# Patient Record
Sex: FEMALE | Race: WHITE
[De-identification: names, ages, dates, MRNs, and addresses within clinical notes are randomized per-mention and may not be internally consistent; named-entity substitution may affect disease eponyms.]

---

## 2017-08-23 ENCOUNTER — HOSPITAL ENCOUNTER (EMERGENCY)
Dept: HOSPITAL 10 - FTE | Age: 42
Discharge: HOME | End: 2017-08-23
Payer: COMMERCIAL

## 2017-08-23 VITALS — SYSTOLIC BLOOD PRESSURE: 108 MMHG | RESPIRATION RATE: 20 BRPM | DIASTOLIC BLOOD PRESSURE: 59 MMHG | HEART RATE: 68 BPM

## 2017-08-23 VITALS — WEIGHT: 174.17 LBS | BODY MASS INDEX: 40.31 KG/M2 | HEIGHT: 55 IN

## 2017-08-23 DIAGNOSIS — N39.0: ICD-10-CM

## 2017-08-23 DIAGNOSIS — R11.0: ICD-10-CM

## 2017-08-23 DIAGNOSIS — R10.11: Primary | ICD-10-CM

## 2017-08-23 LAB
ADD UMIC: YES
ALBUMIN SERPL-MCNC: 4.1 G/DL (ref 3.3–4.9)
ALBUMIN/GLOB SERPL: 1.05 {RATIO}
ALP SERPL-CCNC: 126 IU/L (ref 42–121)
ALT SERPL-CCNC: 89 IU/L (ref 13–69)
ANION GAP SERPL CALC-SCNC: 20 MMOL/L (ref 8–16)
AST SERPL-CCNC: 58 IU/L (ref 15–46)
BACTERIA #/AREA URNS HPF: (no result) /HPF
BASOPHILS # BLD AUTO: 0.1 10^3/UL (ref 0–0.1)
BASOPHILS NFR BLD: 0.7 % (ref 0–2)
BILIRUB DIRECT SERPL-MCNC: 0 MG/DL (ref 0–0.2)
BILIRUB SERPL-MCNC: 0.1 MG/DL (ref 0.2–1.3)
BUN SERPL-MCNC: 13 MG/DL (ref 7–20)
CALCIUM SERPL-MCNC: 9.4 MG/DL (ref 8.4–10.2)
CHLORIDE SERPL-SCNC: 101 MMOL/L (ref 97–110)
CO2 SERPL-SCNC: 27 MMOL/L (ref 21–31)
COLOR UR: YELLOW
CREAT SERPL-MCNC: 0.57 MG/DL (ref 0.44–1)
EOSINOPHIL # BLD: 0.7 10^3/UL (ref 0–0.5)
EOSINOPHIL NFR BLD: 6.9 % (ref 0–7)
ERYTHROCYTE [DISTWIDTH] IN BLOOD BY AUTOMATED COUNT: 14.6 % (ref 11.5–14.5)
GLOBULIN SER-MCNC: 3.9 G/DL (ref 1.3–3.2)
GLUCOSE SERPL-MCNC: 95 MG/DL (ref 70–220)
GLUCOSE UR STRIP-MCNC: NEGATIVE MG/DL
HCT VFR BLD CALC: 37.5 % (ref 37–47)
HGB BLD-MCNC: 12.4 G/DL (ref 12–16)
KETONES UR STRIP.AUTO-MCNC: NEGATIVE MG/DL
LYMPHOCYTES # BLD AUTO: 2.1 10^3/UL (ref 0.8–2.9)
LYMPHOCYTES NFR BLD AUTO: 21.5 % (ref 15–51)
MCH RBC QN AUTO: 25.2 PG (ref 29–33)
MCHC RBC AUTO-ENTMCNC: 33.1 G/DL (ref 32–37)
MCV RBC AUTO: 76.1 FL (ref 82–101)
MONOCYTES # BLD: 1 10^3/UL (ref 0.3–0.9)
MONOCYTES NFR BLD: 10.4 % (ref 0–11)
NEUTROPHILS NFR BLD AUTO: 60.3 % (ref 39–77)
NITRITE UR QL STRIP.AUTO: POSITIVE MG/DL
NRBC # BLD MANUAL: 0 10^3/UL (ref 0–0)
NRBC BLD AUTO-RTO: 0 /100WBC (ref 0–0)
PLATELET # BLD: 448 10^3/UL (ref 140–415)
PMV BLD AUTO: 10.1 FL (ref 7.4–10.4)
POTASSIUM SERPL-SCNC: 4.2 MMOL/L (ref 3.5–5.1)
PROT SERPL-MCNC: 8 G/DL (ref 6.1–8.1)
RBC # BLD AUTO: 4.93 10^6/UL (ref 4.2–5.4)
RBC # UR AUTO: (no result) MG/DL
SODIUM SERPL-SCNC: 144 MMOL/L (ref 135–144)
UR ASCORBIC ACID: NEGATIVE MG/DL
UR BILIRUBIN (DIP): NEGATIVE MG/DL
UR CLARITY: CLEAR
UR PH (DIP): 5 (ref 5–9)
UR RBC: 1 /HPF (ref 0–5)
UR SPECIFIC GRAVITY (DIP): 1.01 (ref 1–1.03)
UR TOTAL PROTEIN (DIP): NEGATIVE MG/DL
UROBILINOGEN UR STRIP-ACNC: NEGATIVE MG/DL
WBC # BLD AUTO: 9.7 10^3/UL (ref 4.8–10.8)
WBC # UR STRIP: (no result) LEU/UL

## 2017-08-23 PROCEDURE — 80053 COMPREHEN METABOLIC PANEL: CPT

## 2017-08-23 PROCEDURE — 81001 URINALYSIS AUTO W/SCOPE: CPT

## 2017-08-23 PROCEDURE — 85025 COMPLETE CBC W/AUTO DIFF WBC: CPT

## 2017-08-23 PROCEDURE — 76705 ECHO EXAM OF ABDOMEN: CPT

## 2017-08-23 PROCEDURE — 36415 COLL VENOUS BLD VENIPUNCTURE: CPT

## 2017-08-23 PROCEDURE — 96374 THER/PROPH/DIAG INJ IV PUSH: CPT

## 2017-08-23 PROCEDURE — 96375 TX/PRO/DX INJ NEW DRUG ADDON: CPT

## 2017-08-23 PROCEDURE — 83690 ASSAY OF LIPASE: CPT

## 2017-08-23 NOTE — RADRPT
PROCEDURE:   US Abdomen. 

 

CLINICAL INDICATION:   abdominal pain 

 

TECHNIQUE:   Multiple real-time images were acquired of the patient's right upper quadrant abdomen a
nd retroperitoneum utilizing a high resolution transducer. 

 

COMPARISON:   None 

 

FINDINGS:

 

The liver demonstrates increased echogenicity.  The liver is normal in size and no focal solid lesio
ns are seen. The liver measures 14.7 cm in length. The portal vein is patent with normal direction o
f flow.  No intrahepatic biliary dilatation is seen. 

 

No gallstones are identified within the gallbladder.  There is no pericholecystic fluid or gallbladd
er wall thickening. The common bile duct measures 3 mm in maximal dimension.  

 

The visualized portions of the pancreas are unremarkable.   The tail of the pancreas is not seen.

 

No free fluid is identified.

 

The right kidney is normal in size, and demonstrate normal echogenicity and cortical thickness.  The
 right kidney measures 10.9 cm in long dimension.  There is no evidence of hydronephrosis. There are
 no kidney stones.  

 

 

 

RPTAT: AA

 

IMPRESSION:

 

Fatty infiltration of the liver.

No evidence of gallstones.

_____________________________________________ 

.Long Singleton MD, MD           Date    Time 

Electronically viewed and signed by .Long Singleton MD, MD on 08/23/2017 08:51 

 

D:  08/23/2017 08:51  T:  08/23/2017 08:51

.S/

## 2017-08-23 NOTE — ERD
ER Documentation


Chief Complaint


Date/Time


DATE: 17 


TIME: 09:07


Chief Complaint


ruq pain w nausea





HPI


This is a  41-year-old female who presents the emergency department today 

complaining of nausea and abdominal pain for the past 2 days.  Patient states 

that she drank mineral water to help improve her symptoms.  States she has some 

pain in her back.  Denies any fevers or chills or diarrhea.





ROS


All systems reviewed and are negative except as per history of present illness.





Allergies


Allergies:  


Coded Allergies:  


     No Known Allergy (Unverified , 17)





PMhx/Soc


Medical and Surgical Hx:  pt denies Medical Hx, pt denies Surgical Hx


Hx Alcohol Use:  No


Hx Substance Use:  No


Hx Tobacco Use:  No


Smoking Status:  Never smoker





Physical Exam


Vitals





Vital Signs








  Date Time  Temp Pulse Resp B/P Pulse Ox O2 Delivery O2 Flow Rate FiO2


 


17 11:56  68 20 108/59 99 Room Air  


 


17 07:49 98.4 86 20 123/83 98   








Physical Exam


Const:     Obese, no acute distress


Head:   Atraumatic 


Eyes:    Normal Conjunctiva


ENT:    Normal External Ears, Nose and Mouth.


Neck:               Full range of motion..~ No meningismus.


Resp:    Clear to auscultation bilaterally


Cardio:    Regular rate and rhythm, no murmurs


Abd:    Soft, right upper quadrant and epigastric tender non distended. Normal 

bowel sounds.  No tenderness at McBurney's.


Skin:    No petechiae or rashes


Back:    No midline or flank tenderness


Ext:    No cyanosis, or edema


Neur:    Awake and alert


Psych:    Normal Mood and Affect


Result Diagram:  


17 0820                                                                   

             17 0820





Results 24 hrs





 Laboratory Tests








Test


  17


08:10 17


08:20


 


Urine Color YELLOW  


 


Urine Clarity CLEAR  


 


Urine pH 5.0  


 


Urine Specific Gravity 1.015  


 


Urine Ketones NEGATIVEmg/dL  


 


Urine Nitrite POSITIVEmg/dL  


 


Urine Bilirubin NEGATIVEmg/dL  


 


Urine Urobilinogen NEGATIVEmg/dL  


 


Urine Leukocyte Esterase TRACELeu/ul  


 


Urine Microscopic RBC 1/HPF  


 


Urine Microscopic WBC 6/HPF  


 


Urine Bacteria FEW/HPF  


 


Urine Hemoglobin 2+mg/dL  


 


Urine Glucose NEGATIVEmg/dL  


 


Urine Total Protein NEGATIVEmg/dl  


 


White Blood Count  9.710^3/ul 


 


Red Blood Count  4.9310^6/ul 


 


Hemoglobin  12.4g/dl 


 


Hematocrit  37.5% 


 


Mean Corpuscular Volume  76.1fl 


 


Mean Corpuscular Hemoglobin  25.2pg 


 


Mean Corpuscular Hemoglobin


Concent 


  33.1g/dl 


 


 


Red Cell Distribution Width  14.6% 


 


Platelet Count  62845^3/UL 


 


Mean Platelet Volume  10.1fl 


 


Neutrophils %  60.3% 


 


Lymphocytes %  21.5% 


 


Monocytes %  10.4% 


 


Eosinophils %  6.9% 


 


Basophils %  0.7% 


 


Nucleated Red Blood Cells %  0.0/100WBC 


 


Neutrophils # (Manual)  610^3/ul 


 


Lymphocytes #  2.110^3/ul 


 


Monocytes #  1.010^3/ul 


 


Eosinophils #  0.710^3/ul 


 


Basophils #  0.110^3/ul 


 


Nucleated Red Blood Cells #  0.010^3/ul 


 


Sodium Level  144mmol/L 


 


Potassium Level  4.2mmol/L 


 


Chloride Level  101mmol/L 


 


Carbon Dioxide Level  27mmol/L 


 


Anion Gap  20 


 


Blood Urea Nitrogen  13mg/dl 


 


Creatinine  0.57mg/dl 


 


Glucose Level  95mg/dl 


 


Calcium Level  9.4mg/dl 


 


Total Bilirubin  0.1mg/dl 


 


Direct Bilirubin  0.00mg/dl 


 


Indirect Bilirubin  0.1mg/dl 


 


Aspartate Amino Transf


(AST/SGOT) 


  58IU/L 


 


 


Alanine Aminotransferase


(ALT/SGPT) 


  89IU/L 


 


 


Alkaline Phosphatase  126IU/L 


 


Total Protein  8.0g/dl 


 


Albumin  4.1g/dl 


 


Globulin  3.90g/dl 


 


Albumin/Globulin Ratio  1.05 


 


Lipase  60U/L 








 Current Medications








 Medications


  (Trade)  Dose


 Ordered  Sig/Foster


 Route


 PRN Reason  Start Time


 Stop Time Status Last Admin


Dose Admin


 


 Morphine Sulfate


  (morphine)  4 mg  ONCE  STAT


 IV


   17 08:14


 17 08:16 DC 17 08:47


 


 


 Ondansetron HCl


  (Zofran Inj)  4 mg  ONCE  STAT


 IV


   17 08:14


 17 08:16 DC 17 08:46


 


 


 Famotidine


  (Pepcid)  20 mg  ONCE  STAT


 PO


   17 08:14


 17 08:16 DC 17 08:47


 





DIAGNOSTIC IMAGING REPORT





 Patient: LUL TEJADA   : 1975   Age: 41  Sex: F                    

    


 MR #:    [id number]   Acct #:   [id number]    DOS: 1714


 Ordering MD: ALEXIS HIRSCH PA-C   Location:  [facility]   Room/Bed:             

                               


 








PROCEDURE:   US Abdomen. 


 


CLINICAL INDICATION:   abdominal pain 


 


TECHNIQUE:   Multiple real-time images were acquired of the patient's right 

upper quadrant abdomen and retroperitoneum utilizing a high resolution 

transducer. 


 


COMPARISON:   None 


 


FINDINGS:


 


The liver demonstrates increased echogenicity.  The liver is normal in size and 

no focal solid lesions are seen. The liver measures 14.7 cm in length. The 

portal vein is patent with normal direction of flow.  No intrahepatic biliary 

dilatation is seen. 


 


No gallstones are identified within the gallbladder.  There is no 

pericholecystic fluid or gallbladder wall thickening. The common bile duct 

measures 3 mm in maximal dimension.  


 


The visualized portions of the pancreas are unremarkable.   The tail of the 

pancreas is not seen.


 


No free fluid is identified.


 


The right kidney is normal in size, and demonstrate normal echogenicity and 

cortical thickness.  The right kidney measures 10.9 cm in long dimension.  

There is no evidence of hydronephrosis. There are no kidney stones.  


 


 


 


RPTAT: AA


 


IMPRESSION:


 


Fatty infiltration of the liver.


No evidence of gallstones.


_____________________________________________ 


.Long Singleton MD, MD           Date    Time 


Electronically viewed and signed by .Lnog Singleton MD, MD on 2017 08:

51 


 


D:  2017 08:51  T:  2017 08:51


.S/





CC: ALEXIS HIRSCH PA-C





Procedures/[facility]


This 41-year-old female presents emergency department today complaining of 

abdominal pain and nausea and some back pain for the past 2 days.  Given patient

's location of pain in her right upper quadrant I did obtain laboratory work as 

well as an ultrasound





Laboratory workup shows no elevated white blood cell count.  She is not anemic.

  Platelets are within normal limits.  Electrolytes are within normal limits.  

Glucose is within normal limits.  Lipase within normal limits.  Liver enzymes 

are very mildly elevated.


UAshows trace leukocyte esterase and positive nitrite


Urine pregnancy test is negative


Ultrasound shows fatty infiltration of the liver there is no evidence of 

gallstones.  There is no pelvic cystic fluid or gallbladder wall thickening.  

Common bile duct measures 3 mm in maximal dimension.





Patient's abdominal pain and back pain most consistent with her urinary tract 

infection.  I have low suspicion for acute surgical abdomen as patient has no 

lower abdominal pain.  Low suspicion for acute cholecystitis, acute 

appendicitis.





Patient is afebrile and otherwise well-appearing.  She has no CVA tenderness 

and of low suspicion for pyelonephritis or nephrolithiasis.





Patient will be given a prescription for Keflex, short course of Tylenol, 

Zofran for home.





At this time the patient is stable for discharge and outpatient management. 

Patient should follow up with their PCP in the next 1-2 days.  They may return 

to the emergency department sooner for any persistent or worsening of symptoms.

  Patient understood and agreed with the plan.





Departure


Diagnosis:  


 Primary Impression:  


 Abdominal pain


 Abdominal location:  right upper quadrant  Qualified Code:  R10.11 - Right 

upper quadrant abdominal pain


 Additional Impression:  


 UTI (urinary tract infection)


 Urinary tract infection type:  site unspecified  Hematuria presence:  without 

hematuria  Qualified Code:  N39.0 - Urinary tract infection without hematuria, 

site unspecified


Condition:  ALEXIS Recinos PA-C Aug 23, 2017 09:07

## 2018-10-23 ENCOUNTER — HOSPITAL ENCOUNTER (EMERGENCY)
Dept: HOSPITAL 91 - FTE | Age: 43
Discharge: HOME | End: 2018-10-23
Payer: COMMERCIAL

## 2018-10-23 ENCOUNTER — HOSPITAL ENCOUNTER (EMERGENCY)
Age: 43
Discharge: HOME | End: 2018-10-23

## 2018-10-23 DIAGNOSIS — R10.2: ICD-10-CM

## 2018-10-23 DIAGNOSIS — N30.00: Primary | ICD-10-CM

## 2018-10-23 LAB
URINE BLOOD (DIP) POC: (no result)
URINE PH (DIP) POC: 6 (ref 5–8.5)

## 2018-10-23 PROCEDURE — 99283 EMERGENCY DEPT VISIT LOW MDM: CPT

## 2018-10-23 PROCEDURE — 81025 URINE PREGNANCY TEST: CPT

## 2018-10-23 PROCEDURE — 81003 URINALYSIS AUTO W/O SCOPE: CPT

## 2018-10-23 PROCEDURE — 87086 URINE CULTURE/COLONY COUNT: CPT

## 2019-06-12 ENCOUNTER — HOSPITAL ENCOUNTER (EMERGENCY)
Dept: HOSPITAL 91 - FTE | Age: 44
Discharge: HOME | End: 2019-06-12
Payer: COMMERCIAL

## 2019-06-12 ENCOUNTER — HOSPITAL ENCOUNTER (EMERGENCY)
Dept: HOSPITAL 10 - FTE | Age: 44
Discharge: HOME | End: 2019-06-12
Payer: COMMERCIAL

## 2019-06-12 VITALS
HEIGHT: 60 IN | BODY MASS INDEX: 34.71 KG/M2 | WEIGHT: 176.81 LBS | WEIGHT: 176.81 LBS | HEIGHT: 60 IN | BODY MASS INDEX: 34.71 KG/M2

## 2019-06-12 VITALS — HEART RATE: 67 BPM | RESPIRATION RATE: 20 BRPM | DIASTOLIC BLOOD PRESSURE: 72 MMHG | SYSTOLIC BLOOD PRESSURE: 116 MMHG

## 2019-06-12 DIAGNOSIS — E03.9: ICD-10-CM

## 2019-06-12 DIAGNOSIS — R10.2: Primary | ICD-10-CM

## 2019-06-12 DIAGNOSIS — Z79.84: ICD-10-CM

## 2019-06-12 DIAGNOSIS — E11.9: ICD-10-CM

## 2019-06-12 LAB
ADD UMIC: YES
UR ASCORBIC ACID: NEGATIVE MG/DL
UR BILIRUBIN (DIP): NEGATIVE MG/DL
UR BLOOD (DIP): (no result) MG/DL
UR CLARITY: CLEAR
UR COLOR: (no result)
UR GLUCOSE (DIP): NEGATIVE MG/DL
UR KETONES (DIP): NEGATIVE MG/DL
UR LEUKOCYTE ESTERASE (DIP): NEGATIVE LEU/UL
UR NITRITE (DIP): NEGATIVE MG/DL
UR PH (DIP): 6 (ref 5–9)
UR RBC: 0 /HPF (ref 0–5)
UR SPECIFIC GRAVITY (DIP): 1 (ref 1–1.03)
UR SQUAMOUS EPITHELIAL CELL: (no result) /HPF
UR TOTAL PROTEIN (DIP): NEGATIVE MG/DL
UR UROBILINOGEN (DIP): NEGATIVE MG/DL
UR WBC: 0 /HPF (ref 0–5)

## 2019-06-12 PROCEDURE — 81025 URINE PREGNANCY TEST: CPT

## 2019-06-12 PROCEDURE — 81001 URINALYSIS AUTO W/SCOPE: CPT

## 2019-06-12 PROCEDURE — 87591 N.GONORRHOEAE DNA AMP PROB: CPT

## 2019-06-12 PROCEDURE — 76830 TRANSVAGINAL US NON-OB: CPT

## 2019-06-12 PROCEDURE — 99285 EMERGENCY DEPT VISIT HI MDM: CPT

## 2019-06-12 PROCEDURE — 87210 SMEAR WET MOUNT SALINE/INK: CPT

## 2019-06-12 PROCEDURE — 96372 THER/PROPH/DIAG INJ SC/IM: CPT

## 2019-06-12 PROCEDURE — 76856 US EXAM PELVIC COMPLETE: CPT

## 2019-06-12 RX ADMIN — KETOROLAC TROMETHAMINE STA MG: 30 INJECTION, SOLUTION INTRAMUSCULAR at 20:50

## 2019-06-12 RX ADMIN — KETOROLAC TROMETHAMINE 1 MG: 30 INJECTION, SOLUTION INTRAMUSCULAR at 20:50

## 2019-06-12 RX ADMIN — KETOROLAC TROMETHAMINE 1 MG: 30 INJECTION, SOLUTION INTRAMUSCULAR at 21:02

## 2019-06-12 RX ADMIN — KETOROLAC TROMETHAMINE STA MG: 30 INJECTION, SOLUTION INTRAMUSCULAR at 21:02

## 2019-06-12 NOTE — ERD
ER Documentation


Chief Complaint


Chief Complaint





PELVIC PAIN X'S 1 DAY





HPI


History of Present Illness: 43-year-old female who denies a past medical history


coming in today with complaint of pelvic pain that started yesterday.  Patient 


reports a monogamous relationship.  Patient denies vaginal discharge or odor.  


Patient reporting painful urination.  Associated symptoms include left flank 


pain, suprapubic pain





At home pharmacological/nonpharmacological treatment for symptoms: Denies





Denies social concerns; Denies recent foreign travel





ROS


All systems reviewed and are negative except as per history of present illness.





Medications


Home Meds


Active Scripts


Ibuprofen* (Ibuprofen*) 600 Mg Tablet, 600 MG PO Q6H PRN for PAIN AND/OR 


INFLAMMATION, #30 TAB


   Prov:DARREN YOUSIF NP         6/12/19


Fluconazole* (Diflucan*) 150 Mg Tablet, 150 MG PO ONCE, #1 TAB


   Take this medication when you complete course of metronidazole/Flagyl


   treatment for prophylactic treatment for vaginal candidiasis.


   Prov:DARREN YOUSIF NP         6/12/19


Metronidazole* (Flagyl*) 500 Mg Tablet, 500 MG PO BID for VAGINAL INFECTION for 


7 Days, TAB


   Prov:DARREN YOUSIF NP         6/12/19


Naproxen* (Naprosyn*) 500 Mg Tablet, 500 MG PO BID PRN for PAIN AND/OR 


INFLAMMATION, #12 TAB


   With food or milk


   Prov:CHALO HALE MD         2/11/19


Reported Medications


Levothyroxine Sodium* (Levothyroxine Sodium*) 25 Mcg Tablet, 25 MCG PO BEFORE 


BREAKFAST, #30 TAB


   2/11/19


Metformin Hcl* (Metformin Hcl*) 500 Mg Tablet, 500 MG PO WITH BREAKFAST, #30 TAB


   2/11/19





Allergies


Allergies:  


Coded Allergies:  


     No Known Allergy (Unverified , 2/11/19)





PMhx/Soc


History of Surgery:  Yes (tubal ligation)


Anesthesia Reaction:  No


Hx Neurological Disorder:  No


Hx Respiratory Disorders:  No


Hx Cardiac Disorders:  No


Hx Psychiatric Problems:  No


Hx Miscellaneous Medical Probl:  Yes (dm and hypothyroidism)


Hx Alcohol Use:  No


Hx Substance Use:  No


Hx Tobacco Use:  No


Smoking Status:  Never smoker





FmHx


Family History:  diabetes; 


   No coronary disease





Physical Exam


Vitals





Vital Signs


  Date      Temp  Pulse  Resp  B/P (MAP)   Pulse Ox  O2          O2 Flow    FiO2


Time                                                 Delivery    Rate


   6/12/19  97.5     67    20      116/72        98  Room Air


     23:50                           (87)


   6/12/19  97.2     83    18      137/67       100


     19:30                           (90)





Physical Exam


Const:   No acute distress


Head:   Atraumatic 


Eyes:    Normal Conjunctiva


ENT:    Normal External Ears, Nose and Mouth.


Neck:               Full range of motion. No meningismus.


Resp:   Clear to auscultation bilaterally


Cardio:   Regular rate and rhythm, no murmurs


Abd:    Soft, suprapubic tenderness,  non distended. Normal bowel sounds.  


Obese.


Skin:   No petechiae or rashes


Back:   No midline or flank tenderness


Ext:    No cyanosis, or edema


Neur:   Awake and alert


Psych:    Normal Mood and Affect


Results 24 hrs





Laboratory Tests


        Test
                            6/12/19
20:58    6/12/19
21:07


        POC Beta HCG, Qualitative        NEGATIVE


        Urine Color                                     STRAW


        Urine Clarity                                   CLEAR


        Urine pH                                                   6.0


        Urine Specific Gravity                                   1.005


        Urine Ketones                                   NEGATIVE mg/dL


        Urine Nitrite                                   NEGATIVE mg/dL


        Urine Bilirubin                                 NEGATIVE mg/dL


        Urine Urobilinogen                              NEGATIVE mg/dL


        Urine Leukocyte Esterase
        
              NEGATIVE
Jim/ul


        Urine Microscopic RBC                                   0 /HPF


        Urine Microscopic WBC                                   0 /HPF


        Urine Squamous Epithelial
Cells  
              FEW /HPF 



        Urine Hemoglobin                                      1+ mg/dL


        Urine Glucose                                   NEGATIVE mg/dL


        Urine Total Protein                             NEGATIVE mg/dl





Current Medications


 Medications
   Dose
          Sig/Foster
       Start Time
   Status  Last


 (Trade)       Ordered        Route
 PRN     Stop Time              Admin
Dose


                              Reason                                Admin


 Ketorolac
     60 mg          ONCE  STAT
    6/12/19       DC           6/12/19


Tromethamine
                 IM
            20:40
                       21:02



 (Toradol)                                   6/12/19 20:41


                500 mg         ONCE  ONCE
    6/13/19       DC           6/12/19


Metronidazole                 PO
            00:00
                       23:48




  (Flagyl)                                  6/13/19 00:00








Procedures/[facility]


ED course includes a thorough examination and history. 


Medications: Ketorolac


Imaging:


Labs: Urinalysis, gonorrhea chlamydia





ED course: Urinalysis negative for infection or hematuria.  Low suspicion for 


kidney stones or urinary tract infection at this time.  We will proceed forward 


with orders for pelvic ultrasound as well as urogenital wet mount.





Low suspicion for life-threatening medical emergency.  Low suspicion for acute 


abdominal emergency.  Low suspicion for genitourinary emergency requires 


hospitalization or immediate surgical intervention.





Otherwise healthy patient presenting with constellation of symptoms likely 


representing bacterial vaginosis and possible pelvic inflammatory disease as 


characterized by history, physical exam findings.  Vaginal exam deferred, 


patient refused, unable to confirm if CMT is present.  Urogenital wet mount 


positive for clue cells.  Ultrasound impression showing:


IMPRESSION:


 


1.  Sonographically unremarkable uterus and ovaries.


 


RPTAT:AAJJ


_____________________________________________ 


Physician Missy           Date    Time 


Electronically viewed and signed by Physician Missy on 06/12/2019 23:03 





Patient reassessment 2330: Patient hemodynamically stable.  Patient reporting 


decrease in pain.  No respiratory distress, otherwise relatively well appearing 


and nontoxic.  Disposition given.  Patient educated on diagnoses, prescriptions,


follow-up care, return precautions.  Strict return precautions given for 


worsening condition; questions answered discharge.





Disposition for discharge with followup in 2 days with PCP/clinic.











DARREN YOUSIF NP            Jun 12, 2019 22:36

## 2019-06-14 LAB — LABORATORY COMMENT REPORT: (no result)
